# Patient Record
(demographics unavailable — no encounter records)

---

## 2025-05-27 NOTE — HISTORY OF PRESENT ILLNESS
[TextEntry] : Patient is a 31 year-old s/p  3 weeks postpartum. Pt is feeling well.  Patient is nursing every 2 hours. Light lochia.  Patient has support at home with .  Postpartum blues noted but has been stable over the past 3 weeks patient functioning well.  She is ambulatory mentality   Physical exam: Breast: No discharge, no masses, no adenopathy, scars from shingles Abdomen: Soft, nontender, nondistended Pelvic exam: Vulva and vagina within normal limits. Cervix: Long closed posterior Uterus: Normal size anteverted No palpable adnexal masses were noted

## 2025-05-27 NOTE — PLAN
[TextEntry] : Patient with a normal postpartum exam examination. Birth control options reviewed with the patient in detail. Pt prescribed mini pill for birth control. Pt to return in July 2025 for regular checkup.  Current Patient with history of postpartum blues stable declines follow-up with therapist at this time resources offered and available should the patient desire. Patient is now starting paternity leave this weekend and patient feels will improve when she has added assistance. Pt referred to Dr. Renzo Evans, vascular surgeon for varicose veins Patient will follow-up as needed. [place] postpartum form reviewed with patient

## 2025-07-08 NOTE — HISTORY OF PRESENT ILLNESS
[FreeTextEntry1] : Patient is a 31 year-old here for a routine annual gynecological follow-up. Pt is 2 months pp and nursing. Pt on minipill for BC. She does not feel well on Minipill, has c/o bloating, spotting, decreased sex drive. Pt is considering ParaGard IUD.  Pt's cycles are usually heavy when not on BC.

## 2025-07-08 NOTE — PLAN
[FreeTextEntry1] : Patient here for a routine annual gynecological follow-up. Patient with normal gynecological exam. Pap smear was done. HPV was done. Gonorrhea and Chlamydia cultures done. Breast self-examination instructed. Pt with c/o spotting, bloating, and decreased libido on Minipill and is considering other BC options. Bleeding, expectations, with Mirena/ParaGard IUD reviewed with pt. Pt prefers ParaGard IUD. Pt to RTO in August 2025 for ParaGard IUD. Pt advised to take 3 motrin before procedure.  PHQ-9 questionnaire reviewed with patient. Patient scored 7. Pt notes some depression and would like therapy but cannot afford therapy now. She feels she is improving overall. Total 5 minutes spent with the patient regarding questionnaire.    I, Deidre Brady, acted solely as a scribe for Dr. Amandeep Villalba on this date 07/08/2025.   All medical record entries made by the Scribe were at my, Dr. Amandeep Villalba, direction and personally dictated by me on 07/08/2025. I have reviewed the chart and agree that the record accurately reflects my personal performance of the history, physical exam, assessment and plan. I have also personally directed, reviewed, and agreed with the chart.

## 2025-07-08 NOTE — PHYSICAL EXAM
[Chaperoned Physical Exam] : A chaperone was present in the examining room during all aspects of the physical examination. [MA] : MA [Appropriately responsive] : appropriately responsive [Alert] : alert [No Acute Distress] : no acute distress [No Lymphadenopathy] : no lymphadenopathy [Soft] : soft [Non-tender] : non-tender [Non-distended] : non-distended [No HSM] : No HSM [No Lesions] : no lesions [No Mass] : no mass [Oriented x3] : oriented x3 [Examination Of The Breasts] : a normal appearance [No Masses] : no breast masses were palpable [Labia Majora] : normal [Labia Minora] : normal [Normal] : normal [Anteversion] : anteverted [Uterine Adnexae] : normal [FreeTextEntry2] : GUADALUPE GLOVER